# Patient Record
Sex: FEMALE | Race: WHITE | HISPANIC OR LATINO | URBAN - METROPOLITAN AREA
[De-identification: names, ages, dates, MRNs, and addresses within clinical notes are randomized per-mention and may not be internally consistent; named-entity substitution may affect disease eponyms.]

---

## 2018-01-02 ENCOUNTER — EMERGENCY (EMERGENCY)
Facility: HOSPITAL | Age: 33
LOS: 1 days | Discharge: HOME | End: 2018-01-02
Attending: EMERGENCY MEDICINE | Admitting: EMERGENCY MEDICINE
Payer: MEDICAID

## 2018-01-02 VITALS
RESPIRATION RATE: 16 BRPM | OXYGEN SATURATION: 99 % | TEMPERATURE: 97 F | HEART RATE: 68 BPM | SYSTOLIC BLOOD PRESSURE: 133 MMHG | DIASTOLIC BLOOD PRESSURE: 83 MMHG

## 2018-01-02 PROCEDURE — 99282 EMERGENCY DEPT VISIT SF MDM: CPT

## 2018-01-02 NOTE — ED PROVIDER NOTE - OBJECTIVE STATEMENT
32F history of psoriasis, sees dermatology in [place] and receives ?injection p/w worsening psoriasis. States rash on RUE and RLE is worsening over the past few days, pruritic, not pain, no fevers or chills. Wants a new dermatologist. Has not been utilizing hydrocortisone cream. Not on any meds.

## 2018-01-02 NOTE — ED PROVIDER NOTE - SKIN COLOR
confluent, erythematous, non tender plaques x 10cm over extensor surfaces of left elbow and left knee

## 2018-01-02 NOTE — ED ADULT TRIAGE NOTE - CHIEF COMPLAINT QUOTE
Pt here for evaluation of a rash that has been present x 7 years. pt dx'ed with psoriasis, was given medication but rash still persist. pt here for further evaluation. denies any pain, fevers, or chills

## 2018-01-02 NOTE — ED PROVIDER NOTE - MEDICAL DECISION MAKING DETAILS
psoriasis with no signs of superinfection, advised pt to use topical hydrocortisone cream and Derm referral given.

## 2018-11-09 ENCOUNTER — EMERGENCY (EMERGENCY)
Facility: HOSPITAL | Age: 33
LOS: 1 days | Discharge: HOME | End: 2018-11-09
Attending: EMERGENCY MEDICINE | Admitting: EMERGENCY MEDICINE
Payer: MEDICAID

## 2018-11-09 VITALS
RESPIRATION RATE: 16 BRPM | SYSTOLIC BLOOD PRESSURE: 126 MMHG | TEMPERATURE: 98 F | OXYGEN SATURATION: 100 % | HEART RATE: 59 BPM | DIASTOLIC BLOOD PRESSURE: 81 MMHG

## 2018-11-09 PROBLEM — L40.9 PSORIASIS, UNSPECIFIED: Chronic | Status: ACTIVE | Noted: 2018-01-02

## 2018-11-09 PROCEDURE — 99283 EMERGENCY DEPT VISIT LOW MDM: CPT

## 2018-11-09 NOTE — ED PROVIDER NOTE - OBJECTIVE STATEMENT
34 y/o female, no medical problems c/o rash under her breasts which she noticed yesterday. Denies fever, pain, rash elsewhere.

## 2023-04-20 ENCOUNTER — EMERGENCY (EMERGENCY)
Facility: HOSPITAL | Age: 38
LOS: 1 days | Discharge: HOME | End: 2023-04-20
Attending: EMERGENCY MEDICINE | Admitting: EMERGENCY MEDICINE
Payer: MEDICAID

## 2023-04-20 VITALS
TEMPERATURE: 98 F | DIASTOLIC BLOOD PRESSURE: 68 MMHG | RESPIRATION RATE: 16 BRPM | OXYGEN SATURATION: 100 % | SYSTOLIC BLOOD PRESSURE: 140 MMHG | HEART RATE: 65 BPM

## 2023-04-20 PROCEDURE — 99284 EMERGENCY DEPT VISIT MOD MDM: CPT

## 2023-04-20 RX ORDER — IBUPROFEN 200 MG
400 TABLET ORAL ONCE
Refills: 0 | Status: COMPLETED | OUTPATIENT
Start: 2023-04-20 | End: 2023-04-20

## 2023-04-20 RX ORDER — ACETAMINOPHEN 500 MG
975 TABLET ORAL ONCE
Refills: 0 | Status: COMPLETED | OUTPATIENT
Start: 2023-04-20 | End: 2023-04-20

## 2023-04-20 RX ADMIN — Medication 975 MILLIGRAM(S): at 07:20

## 2023-04-20 RX ADMIN — Medication 400 MILLIGRAM(S): at 07:20

## 2023-04-20 NOTE — ED PROVIDER NOTE - CLINICAL SUMMARY MEDICAL DECISION MAKING FREE TEXT BOX
38y p/w toe pain  vss, on exam mild joint swelling, no tenderness. no wounds, redness, warmth noted.   no leg edema. likely arthritis vs gout.  low suspicion for cellulitis, nec fasc, fx  will offer xray for eval, control pain, podiatry folow up  likely dc

## 2023-04-20 NOTE — ED PROVIDER NOTE - PHYSICAL EXAMINATION
Vital signs reviewed  GENERAL: Patient nontoxic appearing, NAD  HEAD: NCAT  EYES: Anicteric  ENT: MMM  NECK: Supple, non tender  RESPIRATORY: Normal respiratory effort. CTA B/L. No wheezing, rales, rhonchi  CARDIOVASCULAR: Regular rate and rhythm  ABDOMEN: Soft. Nondistended. Nontender. No guarding or rebound. No CVA tenderness.  MUSCULOSKELETAL/EXTREMITIES: Brisk cap refill. 2+ radial pulses. No leg edema.  +mild joint over over left 5th and right 4th toe. no erythema, warmth, tenderness. no streaking. +varicose veins.  2+ dp pulse. sensation intact b/l  SKIN:  Warm and dry  NEURO: AAOx3. No gross FND.  PSYCHIATRIC: Cooperative. Affect appropriate.

## 2023-04-20 NOTE — ED PROVIDER NOTE - OBJECTIVE STATEMENT
[id number]  38y hx of DM p/w joint swelling  pt endorsing that she has been having toe swelling x month. also endorses sometimes having swelling over the feet after walking a lot.  pt went to her PCP few days ago, had xray of feet, awaiting results  pt came today because her left toe was causing more pain. 3/10, worsens with walking. otherwise no numbness, trauma, redness, wounds, fever or leg edema reported

## 2023-04-20 NOTE — ED PROVIDER NOTE - NS ED ROS FT
Constitutional: No fever, chills.  Eyes:  No visual changes  ENMT:  No neck pain  Cardiac:  No chest pain  Respiratory:  No cough, SOB  GI:  No nausea, vomiting, diarrhea, abdominal pain.  :  No dysuria, hematuria  MS:  +toe pain   Neuro:  No headache or lightheadedness  Skin:  No skin rash  Except as documented in the HPI,  all other systems are negative.

## 2023-04-20 NOTE — ED ADULT TRIAGE NOTE - CHIEF COMPLAINT QUOTE
pt c/o bilateral feet swelling x 3 weeks and pain to soles of feet when ambulating. denies any wounds on bottom of feet. PMHx NIDDM.

## 2023-04-20 NOTE — ED PROVIDER NOTE - NSFOLLOWUPINSTRUCTIONS_ED_ALL_ED_FT
ARTHRITIS   WHAT YOU NEED TO KNOW:  Arthritis is pain or disease in one or more joints. There are many types of arthritis. Types such as rheumatoid arthritis cause inflammation in the joints. Other types wear away the cartilage between joints, such as osteoarthritis. This makes the bones of the joint rub together when you move the joint. An infection from bacteria, a virus, or a fungus can also cause arthritis. Your symptoms may be constant, or symptoms may come and go. Arthritis often gets worse over time and can cause permanent joint damage.  DISCHARGE INSTRUCTIONS:  Call your doctor or rheumatologist if:   •You have a fever and severe joint pain or swelling.  •You cannot move the affected joint.  •You have severe joint pain you cannot tolerate.  •You have a new or worsening rash.  •Your pain or swelling does not get better with treatment.  •You have questions or concerns about your condition or care.  Medicines:   •Acetaminophen decreases pain and fever. It is available without a doctor's order. Ask how much to take and how often to take it. Follow directions. Read the labels of all other medicines you are using to see if they also contain acetaminophen, or ask your doctor or pharmacist. Acetaminophen can cause liver damage if not taken correctly. Do not use more than 4 grams (4,000 milligrams) total of acetaminophen in one day.   •NSAIDs, such as ibuprofen, help decrease swelling, pain, and fever. This medicine is available with or without a doctor's order. NSAIDs can cause stomach bleeding or kidney problems in certain people. If you take blood thinner medicine, always ask your healthcare provider if NSAIDs are safe for you. Always read the medicine label and follow directions  •Steroids reduce swelling and pain.  •Prescription pain medicine may be given. Ask your healthcare provider how to take this medicine safely. Some prescription pain medicines contain acetaminophen. Do not take other medicines that contain acetaminophen without talking to your healthcare provider. Too much acetaminophen may cause liver damage. Prescription pain medicine may cause constipation. Ask your healthcare provider how to prevent or treat constipation.   •Take your medicine as directed. Contact your healthcare provider if you think your medicine is not helping or if you have side effects. Tell him of her if you are allergic to any medicine. Keep a list of the medicines, vitamins, and herbs you take. Include the amounts, and when and why you take them. Bring the list or the pill bottles to follow-up visits. Carry your medicine list with you in case of an emergency.  MANAGE YOUR SYMPTOMS:   •Rest your painful joint so it can heal. Your healthcare provider may recommend crutches or a walker if the affected joint is in a leg.  •Apply ice or heat to the joint. Both can help decrease swelling and pain. Ice may also help prevent tissue damage. Use an ice pack, or put crushed ice in a plastic bag. Cover it with a towel and place it on your joint for 15 to 20 minutes every hour or as directed. You can apply heat for 20 minutes every 2 hours. Heat treatment includes hot packs or heat lamps.  •Elevate your joint. Elevation helps reduce swelling and pain. Raise your joint above the level of your heart as often as you can. Prop your painful joint on pillows to keep it above your heart comfortably.  Elevate Leg  MANAGE ARTHRITIS  •Talk to your healthcare providers about your arthritis medicines. Some medicines may only be needed when you have arthritis pain. You may need to take other medicines every day to prevent arthritis from getting worse. Your healthcare providers will help you understand all your medicines and when to take them. It is important to take the medicines as directed, even if you start to feel better. You can continue to have joint damage and inflammation even if you do not feel it.  •Eat a variety of healthy foods. Healthy foods include fruits, vegetables, whole-grain breads, low-fat dairy products, beans, lean meats, and fish. Ask if you need to be on a special diet. A diet rich in calcium and vitamin D may decrease your risk of osteoporosis. Foods high in calcium include milk, cheese, broccoli, and tofu. Vitamin D may be found in meat, fish, fortified milk, cereal and bread. Ask if you need calcium or vitamin D supplements.   •Go to physical or occupational therapy as directed. A physical therapist can teach you exercises to improve flexibility and range of motion. You may also be shown non-weight-bearing exercises that are safe for your joints, such as swimming. Exercise can help keep your joints flexible and reduce pain. An occupational therapist can help you learn to do your daily activities when your joints are stiff or sore.  •Maintain a healthy weight. Extra weight puts increased pressure on your joints. Ask your healthcare provider what you should weigh. If you need to lose weight, he or she can help you create a weight loss program. Weight loss can help reduce pain and increase your ability to do your activities.  •Wear flat or low-heeled shoes. This will help decrease pain and reduce pressure on your ankle, knee, and hip joints.  •Do not smoke. Nicotine and other chemicals in cigarettes and cigars can damage your bones and joints. Ask your healthcare provider for information if you currently smoke and need help to quit. E-cigarettes or smokeless tobacco still contain nicotine. Talk to your healthcare provider before you use these products.   Support devices:   •Orthotic shoes or insoles help support your feet when you walk.  •Crutches, a cane, or a walker may help decrease your risk for falling. They also decrease stress on affected joints.  •Devices to prevent falls include raised toilet seats and bathtub bars to help you get up from sitting. Handrails can be placed in areas where you need balance and support.  •Devices to help with support and rest include splints to wear on your hands and a firm pillow while you sleep. Use a pillow that is firm enough to support your neck and head.  Follow up with your healthcare provider or rheumatologist as directed: Write down your questions so you remember to ask them during your visits.  Artritis    LO QUE NECESITA SABER:  La artritis es dolor o enfermedad en nagi o más articulaciones. Existen muchos tipos de artritis. Los tipos candido la artritis reumatoide provocan inflamación en las articulaciones. Otros tipos desgastan el cartílago entre las articulaciones. [place] hace que los huesos de las articulaciones se rocen entre sí cuando usted mueve la articulación. Nagi infección por bacterias, un virus o un hongo también puede causar artritis. Lynne síntomas pueden ser constantes o pueden ser intermitentes. La artritis por lo general empeora con el paso del tiempo y puede provocar daños permanentes en la articulación.  INSTRUCCIONES SOBRE EL BRYAN HOSPITALARIA:  Llame a lemus médico o reumatólogo si:  Usted tiene fiebre y un suzy dolor o inflamación en la articulación.  Usted no puede  la articulación afectada.  Usted tiene dolor articular intenso que no puede tolerar.  Usted tiene un sarpullido nuevo o que empeora.  Lemus dolor o inflamación no mejoran con el tratamiento.  Usted tiene preguntas o inquietudes acerca de lemus condición o cuidado.  Medicamentos:  Acetaminofénalivia el dolor y baja la fiebre. Está disponible sin receta médica. Pregunte la cantidad y la frecuencia con que debe tomarlos. Siga las indicaciones. Pilar las etiquetas de todos los demás medicamentos que esté usando para saber si también contienen acetaminofén, o pregunte a lemus médico o farmacéutico. El acetaminofén puede causar daño en el hígado cuando no se leonora de forma correcta. No use más de 4 gramos (4000 miligramos) en total de acetaminofeno en un día.  Los SHRADDHA,candido el ibuprofeno, ayudan a disminuir la inflamación, el dolor y la fiebre. [place] medicamento está disponible con o sin nagi receta médica. Los SHRADDHA pueden causar sangrado estomacal o problemas renales en ciertas personas. Si usted leonora un medicamento anticoagulante, siempre pregúntele a lemus médico si los SHRADDHA son seguros para usted. Siempre pilar la etiqueta de courtney medicamento y siga las instrucciones.  Esteroidesreducen la inflamación y el dolor.  Puede administrarsepodrían administrarse. Pregunte al médico cómo debe radha [place] medicamento de forma mendez. Algunos medicamentos recetados para el dolor contienen acetaminofén. No tome otros medicamentos que contengan acetaminofén sin consultarlo con lemus médico. Demasiado acetaminofeno puede causar daño al hígado. Los medicamentos recetados para el dolor podrían causar estreñimiento. Pregunte a lemus médico candido prevenir o tratar estreñimiento.  [place] lynne medicamentos candido se le haya indicado.Consulte con lemus médico si usted kashif que lemus medicamento no le está ayudando o si presenta efectos secundarios. Infórmele si es alérgico a algún medicamento. Mantenga nagi lista actualizada de los medicamentos, las vitaminas y los productos herbales que leonora. Incluya los siguientes datos de los medicamentos: cantidad, frecuencia y motivo de administración. Traiga con usted la lista o los envases de las píldoras a lynne citas de seguimiento. Lleve la lista de los medicamentos con usted en lauro de nagi emergencia.  El manejo de lynne síntomas:  Descanse lemus articulación adolorida para que pueda recuperarse.Lemus médico podría recomendarle que use muletas o un caminador si la articulación afectada está en nagi pierna.  Aplique hielo o calor a lemus articulación.Aplique hielo a lemus articulación. El hielo también puede contribuir a evitar el daño de los tejidos. Use nagi compresa de hielo o ponga hielo triturado en nagi bolsa de plástico. Cúbrala con nagi toalla y póngasela en la articulación adolorida de 15 a 20 minutos cada hora o según las indicaciones. Usted puede aplicarse calor rasheed 20 minutos cada 2 horas. Para el tratamiento con calor puede usar compresas calientes o nagi lámpara de calor.  Eleve lemus articulación.[place] le ayudará a disminuir la inflamación y el dolor. Eleve lemus articulación por encima del nivel del corazón con la frecuencia que pueda. Apoye lemus articulación adolorida sobre almohadas para mantenerla cómodamente por encima del nivel del corazón.  Elevar la pierna  Controle la artritis:  Informe a lynne médicos sobre los medicamentos que leonora para la artritis.Es posible que deba radha algunos medicamentos solamente si usted siente dolor vinculado con la artritis. Es posible que deba radha otros medicamentos todos los días para evitar que la artritis empeore. Lynne médicos le ayudarán a entender todos lynne medicamentos y cuándo tomarlos. Es importante que tome los medicamentos candido se le indica, incluso si comienza a sentirse mejor. Usted puede continuar teniendo daño e inflamación articular, incluso si no lo siente.  Consuma alimentos saludables y variados.Los alimentos saludables incluyen frutas, verduras, pan integral, productos lácteos bajos en grasa, frijoles, christian magras y pescado. Pregunte si necesita seguir nagi dieta especial. Es posible que nagi dieta bryan en calcio y vitamina D disminuya lemus riesgo de tener osteoporosis. Los alimentos altos en calcio incluyen leche, queso, brócoli y tofu. La vitamina D puede encontrarse en la carne, el pescado, la leche fortificada, los cereales y el pan. Consulte si debe radha suplementos de calcio o de vitamina D.  Asista a terapia física u ocupacional candido se le indique.Un fisioterapeuta le puede enseñar ejercicios para mejorar la flexibilidad y el rango de movimiento. También le pueden mostrar ejercicios que no implican soporte de peso y que son adecuados para las articulaciones candido la natación. El ejercicio puede ayudarle con la flexibilidad de las articulaciones y a reducir el dolor. Un terapeuta ocupacional puede ayudarle para que aprenda a hacer lynne actividades cotidianas cuando lynne articulaciones estén rígidas o adoloridas.  Mantenga un peso saludable.El exceso de peso ejerce presión a lynne articulaciones. Pregunte a lemus proveedor cuál debería de ser lemus peso. Si necesita perder peso, él puede ayudarle a crear un programa para bajar de peso. La pérdida de peso le puede ayudar a reducir el dolor y aumentar lemus habilidad para llevar a cabo lynne actividades.  Use zapatos sin tacones o de tacones bajos.[place] le servirá para aliviar el dolor y a disminuir la presión que se ejerce en las articulaciones de lemus tobillo, rodilla y caderas.  No fume.La nicotina y otros químicos contenidos en los cigarrillos y cigarros pueden dañar los huesos y las articulaciones. Pida información a lemus médico si usted actualmente fuma y necesita ayuda para dejar de fumar. Los cigarrillos electrónicos o el tabaco sin humo igualmente contienen nicotina. Consulte con lemus médico antes de utilizar estos productos.  Dispositivos de apoyo:  Los zapatos o plantillas ortopédicossirven para proporcionar soporte a lemus pies cuando camina.  Unas muletas, un bastón o un caminadorpodrían ayudar a reducir el riesgo de nagi caída. También disminuyen la presión sobre las articulaciones afectadas.  Los dispositivos para evitar las caídasincluyen los asientos elevados para el inodoro y las barras de apoyo de la erwin del baño para ayudarlo a levantarse cuando está sentado. Se pueden colocar barandas en las áreas donde usted necesite equilibrio y apoyo.  Los dispositivos para ayudar con el apoyo y el descansoincluyen férulas para usar en las kiko y nagi almohada firme mientras duerme. Use nagi almohada lo suficientemente firme para que le proporcione soporte al tressa y a la alberto.  Programe nagi robert con lemus médico o reumatólogo candido se le indique:Anote lynne preguntas para que se acuerde de hacerlas rasheed lynne visitas.

## 2023-04-20 NOTE — ED PROVIDER NOTE - NSFOLLOWUPCLINICS_GEN_ALL_ED_FT
[facility]  Podiatry  [address] [place] [address] - [address] [place] [address]  Phone: [phone]  Fax:     [facility]  Podiatry/Wound Care  [address] [place] [address]  Phone: [phone]  Fax: [phone]

## 2023-04-20 NOTE — ED PROVIDER NOTE - PROGRESS NOTE DETAILS
Skye Call PGY-3 spoke with patient regarding her joint swelling. offered xray and pain meds. pt defer xray at this time, states she has an important meeting to attend and does not want to miss her meeting. also she had an xray recently awaiting results.   patient well appearing, stable for discharge, vitals reviewed, given strict return precautions for worsening pain, redness, any trauma to come back to ER

## 2023-04-20 NOTE — ED ADULT NURSE NOTE - OBJECTIVE STATEMENT
37yo female received in room 6. pt A&Ox4, hx of DM, c/o bilateral foot swelling and pain worse after walking x 3 days. Some swelling noted to left 4th and 5th toe, no open wound noted. Pt medicated with PO med. UCG negative. Awaiting for dispo.

## 2024-10-28 NOTE — ED ADULT NURSE NOTE - NSIMPLEMENTINTERV_GEN_ALL_ED
actual
Implemented All Universal Safety Interventions:  [place] to call system. Call bell, personal items and telephone within reach. Instruct patient to call for assistance. Room bathroom lighting operational. Non-slip footwear when patient is off stretcher. Physically safe environment: no spills, clutter or unnecessary equipment. Stretcher in lowest position, wheels locked, appropriate side rails in place.